# Patient Record
(demographics unavailable — no encounter records)

---

## 2024-12-16 NOTE — HISTORY OF PRESENT ILLNESS
[FreeTextEntry1] : I saw this patient in the office today.  As you recall, she described headaches. This has been going on for many years. It has become daily since the birth of her daughter 9 years ago. It waxes and wanes in intensity. When severe it is associated with nausea and photophobia.  She had been taking Fioricet frequently but discontinued it a few months ago and began taking Excedrin instead.  She had side effects from nortriptyline. She has a history of kidney stones and Topamax was therefore avoided. She is already on a beta-blocker. I had started her on Nurtec ODT for prophylaxis.  12/16/2024 visit: There was no response to Nurtec. She continues to have frequent migraines.

## 2024-12-16 NOTE — DATA REVIEWED
[de-identified] : Brain MRI was performed on 4/15/2023. The study was unremarkable. There was a single focus of gliosis in the left subinsular region.